# Patient Record
(demographics unavailable — no encounter records)

---

## 2024-10-17 NOTE — DEVELOPMENTAL MILESTONES
[Normal Development] : Normal Development [None] : none [Pats or smiles at reflection] : pats or smiles at reflection [Begins to turn when name called] : begins to turn when name called [Babbles] : babbles [Rolls over prone to supine] : rolls over prone to supine [Sits briefly without support] : sits briefly without support [Reaches for object and transfers] : reaches for object and transfers [Rakes small object with 4 fingers] : rakes small object with 4 fingers [[place] small object on surface] : bangs small object on surface [Yes] : Completed.

## 2024-10-17 NOTE — HISTORY OF PRESENT ILLNESS
[Parents] : parents [Well-balanced] : well-balanced [Formula ___ oz/feed] : [unfilled] oz of formula per feed [Formula ___ oz in 24hrs] : [unfilled] oz of formula in 24 hours [Vitamins ___] : Patient takes [unfilled] vitamins daily [Fruits] : fruits [Vegetables] : vegetables [Cereal] : cereal [Normal] : Normal [In Bassinet/Crib] : sleeps in bassinet/crib [On back] : sleeps on back [Sleeps 12-16 hours per 24 hours (including naps)] : sleeps 12-16 hours per 24 hours (including naps) [Tummy time] : tummy time [No] : No cigarette smoke exposure [Water heater temperature set at <120 degrees F] : Water heater temperature set at <120 degrees F [Rear facing car seat in back seat] : Rear facing car seat in back seat [Carbon Monoxide Detectors] : Carbon monoxide detectors at home [Smoke Detectors] : Smoke detectors at home. [NO] : No [Exposure to electronic nicotine delivery system] : No exposure to electronic nicotine delivery system [de-identified] : Constipation with the addition of pureed fruits and vegetables in diet [de-identified] : Neocate formula

## 2024-10-17 NOTE — PHYSICAL EXAM
[Alert] : alert [Normocephalic] : normocephalic [Flat Open Anterior [place]] : flat open anterior fontanelle [Red Reflex] : red reflex bilateral [PERRL] : PERRL [Normally Placed Ears] : normally placed ears [Auricles Well Formed] : auricles well formed [Clear Tympanic membranes] : clear tympanic membranes [Light reflex present] : light reflex present [Bony landmarks visible] : bony landmarks visible [Nares Patent] : nares patent [Palate Intact] : palate intact [Uvula Midline] : uvula midline [Supple, full passive range of motion] : supple, full passive range of motion [Symmetric Chest Rise] : symmetric chest rise [Clear to Auscultation Bilaterally] : clear to auscultation bilaterally [Regular Rate and Rhythm] : regular rate and rhythm [S1, S2 present] : S1, S2 present [+2 Femoral Pulses] : (+) 2 femoral pulses [Soft] : soft [Bowel Sounds] : bowel sounds present [Central Urethral Opening] : central urethral opening [Testicles Descended] : testicles descended bilaterally [Patent] : patent [Normally Placed] : normally placed [No Abnormal Lymph Nodes Palpated] : no abnormal lymph nodes palpated [Symmetric Buttocks Creases] : symmetric buttocks creases [Plantar Grasp] : plantar grasp reflex present [Cranial Nerves Grossly Intact] : cranial nerves grossly intact [Acute Distress] : no acute distress [Discharge] : no discharge [Tooth Eruption] : no tooth eruption [Palpable Masses] : no palpable masses [Murmurs] : no murmurs [Tender] : nontender [Distended] : nondistended [Hepatomegaly] : no hepatomegaly [Splenomegaly] : no splenomegaly [Christopher-Ortolani] : negative Christopher-Ortolani [Allis Sign] : negative Allis sign [Spinal Dimple] : no spinal dimple [Tuft of Hair] : no tuft of hair [Rash or Lesions] : no rash/lesions [de-identified] : Plagiocephaly

## 2024-10-17 NOTE — DISCUSSION/SUMMARY
[Normal Growth] : growth [Normal Development] : development [None] : No medical problems [No Elimination Concerns] : elimination [No Feeding Concerns] : feeding [No Skin Concerns] : skin [Normal Sleep Pattern] : sleep [Family Functioning] : family functioning [Nutrition and Feeding] : nutrition and feeding [Infant Development] : infant development [Oral Health] : oral health [Safety] : safety [No Medications] : ~He/She~ is not on any medications [Parental Concerns Addressed] : Parental concerns addressed [] : The components of the vaccine(s) to be administered today are listed in the plan of care. The disease(s) for which the vaccine(s) are intended to prevent and the risks have been discussed with the caretaker.  The risks are also included in the appropriate vaccination information statements which have been provided to the patient's caregiver.  The caregiver has given consent to vaccinate.

## 2024-11-05 NOTE — HISTORY OF PRESENT ILLNESS
[___ Day(s)] : [unfilled] day(s) [de-identified] : Swelling and redness in genital area [FreeTextEntry6] : Acute onset redness and swelling of foreskin surrounding penis.

## 2024-11-05 NOTE — PHYSICAL EXAM
[Circumcised] : circumcised [NL] : warm, clear [FreeTextEntry6] : Foreskin adhesion to head of penis with erythema and swelling of area.

## 2024-11-05 NOTE — DISCUSSION/SUMMARY
[FreeTextEntry1] : Tight foreskin Paraphimosis  Gentle and gradual liberation of foreskin Application of hydrocortisone 1% cream

## 2024-12-03 NOTE — HISTORY OF PRESENT ILLNESS
[de-identified] : Recurring head shaking behavior [FreeTextEntry6] : Parents describe a head shaking behavior which occurs multiple times throughout the day without any specific provocation. These events generally last a few seconds and have been increasing in frequency over the last few months.  They were first observed at 5 months of age, as brief, infrequent episodes.  His developmental milestones to date have been normal.  I observed a recent video of this movement which involves head shaking followed by a stiffening of the upper extremities, lasting less than three seconds.

## 2024-12-03 NOTE — PHYSICAL EXAM
[NL] : warm, clear [de-identified] : Observed periodic head shaking and hypertonicity of upper extremities

## 2024-12-04 NOTE — PLAN
Last seen 10/02/2017, 'PLAN: Continue current cardiac medications.', with no future appointments scheduled; therefore, a prescription was e prescribed to patient's pharmacy per refill protocol. Patient given 90 with 3 refills.   [FreeTextEntry1] : Get REEG as screening test, if episodes worsen, can do longer study for event capture Head US for macrocephaly

## 2024-12-04 NOTE — PHYSICAL EXAM
[Well-appearing] : well-appearing [Normocephalic] : normocephalic [Anterior fontanel- Open] : anterior fontanel- open [Anterior fontanel- Soft] : anterior fontanel- soft [Anterior fontanel- Flat] : anterior fontanel- flat [No dysmorphic facial features] : no dysmorphic facial features [No ocular abnormalities] : no ocular abnormalities [Neck supple] : neck supple [Lungs clear] : lungs clear [Heart sounds regular in rate and rhythm] : heart sounds regular in rate and rhythm [Soft] : soft [No organomegaly] : no organomegaly [No abnormal neurocutaneous stigmata or skin lesions] : no abnormal neurocutaneous stigmata or skin lesions [Straight] : straight [No ya or dimples] : no ya or dimples [No deformities] : no deformities [Alert] : alert [Regards] : regards [Smiling] : smiling [Cooing] : cooing [Pupils reactive to light] : pupils reactive to light [Turns to light] : turns to light [Tracks face, light or objects with full extraocular movements] : tracks face, light or objects with full extraocular movements [No facial asymmetry or weakness] : no facial asymmetry or weakness [No nystagmus] : no nystagmus [Responds to voice/sounds] : responds to voice/sounds [Midline tongue] : midline tongue [No fasciculations] : no fasciculations [Normal axial and appendicular muscle tone with symmetric limb movements] : normal axial and appendicular muscle tone with symmetric limb movements [Normal bulk] : normal bulk [Lift head in prone] : lift head in prone [Roll over] : roll over [No abnormal involuntary movements] : no abnormal involuntary movements [2+ biceps] : 2+ biceps [Knee jerks] : knee jerks [Ankle jerks] : ankle jerks [No ankle clonus] : no ankle clonus [Responds to touch and tickle] : responds to touch and tickle [de-identified] : has mild asymmetry of smile with less depression of the L corner of mouth when the face is activated. [de-identified] : bears weight well when made to stand.

## 2024-12-04 NOTE — PHYSICAL EXAM
[Well-appearing] : well-appearing [Normocephalic] : normocephalic [Anterior fontanel- Open] : anterior fontanel- open [Anterior fontanel- Soft] : anterior fontanel- soft [Anterior fontanel- Flat] : anterior fontanel- flat [No dysmorphic facial features] : no dysmorphic facial features [No ocular abnormalities] : no ocular abnormalities [Neck supple] : neck supple [Lungs clear] : lungs clear [Heart sounds regular in rate and rhythm] : heart sounds regular in rate and rhythm [Soft] : soft [No organomegaly] : no organomegaly [No abnormal neurocutaneous stigmata or skin lesions] : no abnormal neurocutaneous stigmata or skin lesions [Straight] : straight [No ya or dimples] : no ya or dimples [No deformities] : no deformities [Alert] : alert [Regards] : regards [Smiling] : smiling [Cooing] : cooing [Pupils reactive to light] : pupils reactive to light [Turns to light] : turns to light [Tracks face, light or objects with full extraocular movements] : tracks face, light or objects with full extraocular movements [No facial asymmetry or weakness] : no facial asymmetry or weakness [No nystagmus] : no nystagmus [Responds to voice/sounds] : responds to voice/sounds [Midline tongue] : midline tongue [No fasciculations] : no fasciculations [Normal axial and appendicular muscle tone with symmetric limb movements] : normal axial and appendicular muscle tone with symmetric limb movements [Normal bulk] : normal bulk [Lift head in prone] : lift head in prone [Roll over] : roll over [No abnormal involuntary movements] : no abnormal involuntary movements [2+ biceps] : 2+ biceps [Knee jerks] : knee jerks [Ankle jerks] : ankle jerks [No ankle clonus] : no ankle clonus [Responds to touch and tickle] : responds to touch and tickle [de-identified] : has mild asymmetry of smile with less depression of the L corner of mouth when the face is activated. [de-identified] : bears weight well when made to stand.

## 2024-12-04 NOTE — ASSESSMENT
[FreeTextEntry1] : Surya is a 7 months old baby with shuddering spells, as the most likely cause of the described events. The benign and self limiting nature of this diagnosis, association with family history of ET discussed. He has excellent preverbal social skills with no red flags for autism.

## 2024-12-04 NOTE — CONSULT LETTER
[Dear  ___] : Dear  [unfilled], [Consult Letter:] : I had the pleasure of evaluating your patient, [unfilled]. [Please see my note below.] : Please see my note below. [Consult Closing:] : Thank you very much for allowing me to participate in the care of this patient.  If you have any questions, please do not hesitate to contact me. [Sincerely,] : Sincerely, [FreeTextEntry3] : Celia Morgan MD Director, Pediatric Epilepsy [facility] , Pediatric Neurology Residency , [employer] [facility] at [address], [address] [place] [address] Phone: [phone] Fax: [phone]

## 2024-12-04 NOTE — CONSULT LETTER
[Dear  ___] : Dear  [unfilled], [Consult Letter:] : I had the pleasure of evaluating your patient, [unfilled]. [Please see my note below.] : Please see my note below. [Consult Closing:] : Thank you very much for allowing me to participate in the care of this patient.  If you have any questions, please do not hesitate to contact me. [Sincerely,] : Sincerely, [FreeTextEntry3] : Celia Morgan MD Director, Pediatric Epilepsy [facility] , Pediatric Neurology Residency , [employer] [facility] at [address], [address] [place] [address] Phone: [phone] Fax: [phone]

## 2024-12-04 NOTE — HISTORY OF PRESENT ILLNESS
[FreeTextEntry1] : Surya is a delightful 7 months old full term boy. Since 4-5 months of age, he has had intermittent " shivers" or shudders. These occur when alert and awake, he remained connected with his surroundings. There is no cyanosis or tone change and no spasm like features ( jerks, clustering or crying after). I saw videos recorded at home, classic for benign shuddering spells. There are family members including the father, who have essential tremors. He has also been noted to have a large head, no sunsetting or bulging anterior fontanel. His father has a large head. PMD recommended a helmet assessment for positional plagiocephaly, family did not want to do it.  He sometimes twitches, when he eats a new food. He also twirls his ankles constantly. Mother is anxious about him having autism. He is meeting all the milestones on time. Gross motor: he rolls over, assumes crawl posture, bears weight and walks in a walker Fine motor: approach and transfer +, has started isolating index fingers, no handedness. Social and language: he is happy, makes eye contact, joint attention+, stranger anxiety+. He knows his name. He squeals and vocalizes but no syllables,  laughs out loud. He was having reflux, colic, milk allergy thus irritable at times.

## 2024-12-04 NOTE — PLAN
[FreeTextEntry1] : Get REEG as screening test, if episodes worsen, can do longer study for event capture Head US for macrocephaly

## 2025-01-23 NOTE — DISCUSSION/SUMMARY
[Normal Growth] : growth [Normal Development] : development [None] : No known medical problems [No Elimination Concerns] : elimination [No Feeding Concerns] : feeding [No Skin Concerns] : skin [Normal Sleep Pattern] : sleep [Family Adaptation] : family adaptation [Infant Pender] : infant independence [Feeding Routine] : feeding routine [Safety] : safety [No Medications] : ~He/She~ is not on any medications [Mother] : mother [Father] : father [] : The components of the vaccine(s) to be administered today are listed in the plan of care. The disease(s) for which the vaccine(s) are intended to prevent and the risks have been discussed with the caretaker.  The risks are also included in the appropriate vaccination information statements which have been provided to the patient's caregiver.  The caregiver has given consent to vaccinate.

## 2025-01-23 NOTE — PHYSICAL EXAM

## 2025-01-23 NOTE — HISTORY OF PRESENT ILLNESS
[Parents] : parents [Formula ___ oz/feed] : [unfilled] oz of formula per feed [Fruit] : fruit [Vegetables] : vegetables [Cereal] : cereal [Meat] : meat [Eggs] : eggs [Well-balanced] : well-balanced [Normal] : Normal [No] : No cigarette smoke exposure [Water heater temperature set at <120 degrees F] : Water heater temperature set at <120 degrees F [Rear facing car seat in  back seat] : Rear facing car seat in  back seat [Carbon Monoxide Detectors] : Carbon monoxide detectors [Smoke Detectors] : Smoke detectors [FreeTextEntry7] : Recurring benign shuddering spells [NO] : No

## 2025-03-18 NOTE — REVIEW OF SYSTEMS
[Irritable] : irritability [Crying] : crying [Ear Tugging] : ear tugging [Nasal Discharge] : nasal discharge [Cough] : cough [Vomiting] : vomiting [Negative] : Genitourinary [Diarrhea] : no diarrhea

## 2025-03-18 NOTE — HISTORY OF PRESENT ILLNESS
[Fever] : fever [Runny nose] : runny nose [Ear Pain] : ear pain [___ Day(s)] : [unfilled] day(s) [Clear rhinorrhea] : clear rhinorrhea [At Night] : at night [Acetaminophen] : acetaminophen [Last dose: _____] : last dose: [unfilled] [Ear Tugging] : ear tugging [Decreased Appetite] : decreased appetite [Vomiting] : vomiting [Max Temp: ____] : Max temperature: [unfilled] [Stable] : stable [EENT/Resp Symptoms] : EENT/RESPIRATORY SYMPTOMS [Decreased appetite] : decreased appetite [Sick Contacts: ___] : sick contacts: [unfilled] [Diarrhea] : no diarrhea [Decreased Urine Output] : no decreased urine output [Rash] : no rash

## 2025-03-18 NOTE — PHYSICAL EXAM
[No Acute Distress] : acute distress [Irritable] : irritable [Bulging] : bulging [Clear Effusion] : clear effusion [NL] : warm, clear [Erythema] : erythema [Clear Rhinorrhea] : clear rhinorrhea [Clear] : right tympanic membrane not clear [de-identified] :  mucous posterior pharyngeal wall

## 2025-03-18 NOTE — DISCUSSION/SUMMARY
[FreeTextEntry1] : 11 month boy  with acute otitis media, bilaterally  Covid 19 illness- Rapid covid-19 test positive  Azithromycin 5 day course of antibiotic sent to pharmacy. Recommend supportive care including antipyretics, fluids, and nasal saline followed by nasal suction. Return if symptoms worsen or persist. Return in 2 weeks for follow-up

## 2025-03-24 NOTE — HISTORY OF PRESENT ILLNESS
[de-identified] : Otitis media [FreeTextEntry6] : Completed five-day course of azithromycin.  He is afebrile but tugging on his ears.

## 2025-04-15 NOTE — PHYSICAL EXAM
[Supple] : supple [FROM] : full passive range of motion [NL] : warm, clear [de-identified] : Palpable node on right posterior neck, mobile, soft and normal size. No overlying skin changes or tenderness on palpation.

## 2025-04-15 NOTE — HISTORY OF PRESENT ILLNESS
[de-identified] : Lump on side of neck [FreeTextEntry6] : Family member noticed lump on the right side of his neck.  No prodromal illness. Present for one week or longer with no change in size.

## 2025-04-22 NOTE — HISTORY OF PRESENT ILLNESS
[Parents] : parents [Formula ___ oz/feed] : [unfilled] oz of formula per feed [Fruit] : fruit [Vegetables] : vegetables [Meat] : meat [Normal] : Normal [In crib] : In crib [Vitamin] : Primary Fluoride Source: Vitamin [No] : No cigarette smoke exposure [Water heater temperature set at <120 degrees F] : Water heater temperature set at <120 degrees F [Car seat in back seat] : Car seat in back seat [Smoke Detectors] : Smoke detectors [Carbon Monoxide Detectors] : Carbon monoxide detectors [At risk for exposure to TB] : Not at risk for exposure to Tuberculosis [Up to date] : Up to date [NO] : No

## 2025-04-22 NOTE — PHYSICAL EXAM
[Alert] : alert [Normocephalic] : normocephalic [Closed Anterior [place]] : closed anterior fontanelle [Red Reflex] : red reflex bilateral [PERRL] : PERRL [Normally Placed Ears] : normally placed ears [Auricles Well Formed] : auricles well formed [Clear Tympanic membranes] : clear tympanic membranes [Light reflex present] : light reflex present [Bony landmarks visible] : bony landmarks visible [Discharge] : no discharge [Nares Patent] : nares patent [Palate Intact] : palate intact [Uvula Midline] : uvula midline [Tooth Eruption] : tooth eruption [Supple, full passive range of motion] : supple, full passive range of motion [Palpable Masses] : no palpable masses [Symmetric Chest Rise] : symmetric chest rise [Clear to Auscultation Bilaterally] : clear to auscultation bilaterally [Regular Rate and Rhythm] : regular rate and rhythm [S1, S2 present] : S1, S2 present [Murmurs] : no murmurs [+2 Femoral Pulses] : (+) 2 femoral pulses [Soft] : soft [Tender] : nontender [Distended] : nondistended [Bowel Sounds] : normoactive bowel sounds [Hepatomegaly] : no hepatomegaly [Splenomegaly] : no splenomegaly [Central Urethral Opening] : central urethral opening [Testicles Descended] : testicles descended bilaterally [No Abnormal Lymph Nodes Palpated] : no abnormal lymph nodes palpated [Symmetric Abduction and Rotation of Hips] : symmetric abduction and rotation of hips [Allis Sign] : negative Allis sign [Straight] : straight [Cranial Nerves Grossly Intact] : cranial nerves grossly intact [Rash or Lesions] : no rash/lesions

## 2025-04-22 NOTE — PHYSICAL EXAM
[Alert] : alert [Normocephalic] : normocephalic [Closed Anterior [place]] : closed anterior fontanelle [Red Reflex] : red reflex bilateral [PERRL] : PERRL [Normally Placed Ears] : normally placed ears [Auricles Well Formed] : auricles well formed [Clear Tympanic membranes] : clear tympanic membranes [Light reflex present] : light reflex present [Bony landmarks visible] : bony landmarks visible [Discharge] : no discharge [Nares Patent] : nares patent [Palate Intact] : palate intact [Uvula Midline] : uvula midline [Tooth Eruption] : tooth eruption [Supple, full passive range of motion] : supple, full passive range of motion [Palpable Masses] : no palpable masses [Symmetric Chest Rise] : symmetric chest rise [Clear to Auscultation Bilaterally] : clear to auscultation bilaterally [Regular Rate and Rhythm] : regular rate and rhythm [S1, S2 present] : S1, S2 present [Murmurs] : no murmurs [+2 Femoral Pulses] : (+) 2 femoral pulses [Soft] : soft [Tender] : nontender [Distended] : nondistended [Bowel Sounds] : normoactive bowel sounds [Hepatomegaly] : no hepatomegaly [Splenomegaly] : no splenomegaly [Central Urethral Opening] : central urethral opening [Testicles Descended] : testicles descended bilaterally [No Abnormal Lymph Nodes Palpated] : no abnormal lymph nodes palpated [Symmetric Abduction and Rotation of Hips] : symmetric abduction and rotation of hips [Allis Sign] : negative Allis sign [Straight] : straight [Cranial Nerves Grossly Intact] : cranial nerves grossly intact [Rash or Lesions] : no rash/lesions

## 2025-04-22 NOTE — DEVELOPMENTAL MILESTONES
[Yes: _______] : yes, [unfilled] [Imitates new gestures] : imitates new gestures [Uses one word other than Mom or] : does not use one word other than Mom or Dad or personal names [Follows a verbal command that] : follows a verbal command that includes a gesture [Takes first independent] : does not take first independent steps [Stands without support] : stands without support [Picks up small object with 2 finger] : picks up small object with 2 finger pincer grasp [Picks up food and eats it] : picks up food and eats it [Yes] : Completed.

## 2025-04-22 NOTE — DISCUSSION/SUMMARY
[Normal Growth] : growth [None] : No known medical problems [No Elimination Concerns] : elimination [No Feeding Concerns] : feeding [No Skin Concerns] : skin [Normal Sleep Pattern] : sleep [Delayed Language Skills] : delayed language skills [Family Support] : family support [Establishing Routines] : establishing routines [Feeding and Appetite Changes] : feeding and appetite changes [Establishing A Dental Home] : establishing a dental home [Safety] : safety [No Medications] : ~He/She~ is not on any medications [Mother] : mother [Father] : father [] : The components of the vaccine(s) to be administered today are listed in the plan of care. The disease(s) for which the vaccine(s) are intended to prevent and the risks have been discussed with the caretaker.  The risks are also included in the appropriate vaccination information statements which have been provided to the patient's caregiver.  The caregiver has given consent to vaccinate.

## 2025-05-10 NOTE — PHYSICAL EXAM
[NL] : warm, clear [Clear] : right tympanic membrane not clear [Erythema] : erythema [Retracted] : retracted [Erythematous Oropharynx] : erythematous oropharynx [Tooth Eruption] : tooth eruption  [de-identified] :  mucous posterior pharyngeal wall

## 2025-05-10 NOTE — DISCUSSION/SUMMARY
[FreeTextEntry1] : 12 month boy  with acute otitis media, bilaterally   Azithromycin for 5 day course of antibiotic sent to pharmacy. (Mother allergic to PCN) Recommend supportive care including antipyretics, fluids, and nasal saline followed by nasal suction.  Zyrtec at bedtime for nasal congestion. Return if symptoms worsen or persist. Return in 2 weeks for follow-up

## 2025-05-10 NOTE — HISTORY OF PRESENT ILLNESS
[EENT/Resp Symptoms] : EENT/RESPIRATORY SYMPTOMS [Max Temp: ____] : Max temperature: [unfilled] [Runny nose] : runny nose [Ear pain] : ear pain [___ Day(s)] : [unfilled] day(s) [Irritable] : irritable [Decreased appetite] : decreased appetite [Sick Contacts: ___] : no sick contacts [Clear rhinorrhea] : clear rhinorrhea [Acetaminophen] : acetaminophen [Ibuprofen] : ibuprofen [Last dose: _____] : last dose: [unfilled] [Fever] : fever [Ear Tugging] : ear tugging [Runny Nose] : runny nose [Nasal Congestion] : nasal congestion [Teething] : teething [Decreased Appetite] : decreased appetite [Vomiting] : no vomiting [Diarrhea] : no diarrhea [Stable] : stable

## 2025-05-10 NOTE — REVIEW OF SYSTEMS
[Fever] : fever [Negative] : Genitourinary [Ear Tugging] : ear tugging [Nasal Discharge] : nasal discharge

## 2025-05-17 NOTE — REVIEW OF SYSTEMS
[Irritable] : irritability [Nasal Discharge] : nasal discharge [Nasal Congestion] : nasal congestion [Cough] : cough [Rash] : rash [Negative] : Genitourinary

## 2025-05-17 NOTE — PHYSICAL EXAM
[Pink Nasal Mucosa] : pink nasal mucosa [Clear Rhinorrhea] : clear rhinorrhea [Erythematous Oropharynx] : erythematous oropharynx [NL] : moves all extremities x4, warm, well perfused x4 [Erythematous] : erythematous [Maculopapular Eruption] : maculopapular eruption [Trunk] : trunk [de-identified] : postnasal drip

## 2025-05-17 NOTE — HISTORY OF PRESENT ILLNESS
[___ Day(s)] : [unfilled] day(s) [de-identified] : Rash, irritable, congestion and cough [FreeTextEntry6] : Seen on 5/10/2025 for otitis media and treated with zithromax for five days.  No fever after two days of medication.  Developed a rash yesterday and continues to have nasal congestion, discharge and cough.

## 2025-07-22 NOTE — PHYSICAL EXAM
[Alert] : alert [No Acute Distress] : no acute distress [Normocephalic] : normocephalic [Anterior Mead Closed] : anterior fontanelle closed [Red Reflex Bilateral] : red reflex bilateral [PERRL] : PERRL [Normally Placed Ears] : normally placed ears [Auricles Well Formed] : auricles well formed [Clear Tympanic membranes with present light reflex and bony landmarks] : clear tympanic membranes with present light reflex and bony landmarks [No Discharge] : no discharge [Nares Patent] : nares patent [Palate Intact] : palate intact [Uvula Midline] : uvula midline [Tooth Eruption] : tooth eruption  [Supple, full passive range of motion] : supple, full passive range of motion [No Palpable Masses] : no palpable masses [Symmetric Chest Rise] : symmetric chest rise [Clear to Auscultation Bilaterally] : clear to auscultation bilaterally [Regular Rate and Rhythm] : regular rate and rhythm [S1, S2 present] : S1, S2 present [No Murmurs] : no murmurs [+2 Femoral Pulses] : +2 femoral pulses [Soft] : soft [NonTender] : non tender [Non Distended] : non distended [Normoactive Bowel Sounds] : normoactive bowel sounds [No Hepatomegaly] : no hepatomegaly [No Splenomegaly] : no splenomegaly [Central Urethral Opening] : central urethral opening [Testicles Descended Bilaterally] : testicles descended bilaterally [Patent] : patent [Normally Placed] : normally placed [No Abnormal Lymph Nodes Palpated] : no abnormal lymph nodes palpated [No Clavicular Crepitus] : no clavicular crepitus [Negative Christopher-Ortalani] : negative Christopher-Ortalani [Symmetric Buttocks Creases] : symmetric buttocks creases [No Spinal Dimple] : no spinal dimple [NoTuft of Hair] : no tuft of hair [Cranial Nerves Grossly Intact] : cranial nerves grossly intact [No Rash or Lesions] : no rash or lesions

## 2025-07-22 NOTE — DEVELOPMENTAL MILESTONES
[Follows directions that do not] : follows direction that do not include a gesture [Begins to run] : begins to run [Yes] : Completed. [Normal Development] : Normal Development [Yes: _______] : yes, [unfilled] [Drinks from cup with little] : drinks from cup with little spilling [Points to ask for something] : points to ask for something or to get help [Speaks in sounds that seem like] : speaks in sounds that seem like an unknown language [Looks when parent says,] : looks when parent says, "Where is...?" [Squats to  objects] : squats to  objects [Crawls up a few steps] : crawls up a few steps [Drops object into and takes object] : drops object into and takes object out of container [Imitates scribbling] : does not imitate scribbling [Uses 3 words other than names] : does not use 3 words other than names [Makes chris with crayon] : does not makes chris with crayon [FreeTextEntry1] : Walks independently but sometimes toe walks.  One spoken word but turns pages of book to reveal objects asked for.  Settles down when comfortable in familiar environment. Hits and bites when frustrated

## 2025-07-22 NOTE — DISCUSSION/SUMMARY
[Normal Growth] : growth [None] : No known medical problems [No Elimination Concerns] : elimination [No Feeding Concerns] : feeding [No Skin Concerns] : skin [Normal Sleep Pattern] : sleep [Delayed Language Skills] : delayed language skills [Communication and Social Development] : communication and social development [Sleep Routines and Issues] : sleep routines and issues [Temper Tantrums and Discipline] : temper tantrums and discipline [Healthy Teeth] : healthy teeth [Safety] : safety [No Medications] : ~He/She~ is not on any medications [Mother] : mother [Father] : father [FreeTextEntry9] : Increase reading time and social interaction.  Discourage biting and hitting with firm and consistent verbal rejection.  [] : The components of the vaccine(s) to be administered today are listed in the plan of care. The disease(s) for which the vaccine(s) are intended to prevent and the risks have been discussed with the caretaker.  The risks are also included in the appropriate vaccination information statements which have been provided to the patient's caregiver.  The caregiver has given consent to vaccinate.